# Patient Record
Sex: MALE | Race: WHITE | Employment: OTHER | URBAN - METROPOLITAN AREA
[De-identification: names, ages, dates, MRNs, and addresses within clinical notes are randomized per-mention and may not be internally consistent; named-entity substitution may affect disease eponyms.]

---

## 2021-06-07 ENCOUNTER — HOSPITAL ENCOUNTER (OUTPATIENT)
Dept: CT IMAGING | Facility: HOSPITAL | Age: 66
Discharge: HOME | End: 2021-06-07
Attending: FAMILY MEDICINE

## 2021-06-07 DIAGNOSIS — E78.5 HYPERLIPIDEMIA, UNSPECIFIED HYPERLIPIDEMIA TYPE: ICD-10-CM

## 2023-06-20 ENCOUNTER — HOSPITAL ENCOUNTER (OUTPATIENT)
Dept: GENERAL RADIOLOGY | Age: 68
Discharge: HOME | End: 2023-06-20
Attending: FAMILY MEDICINE
Payer: MEDICARE

## 2023-06-20 DIAGNOSIS — G71.00 MUSCULAR DYSTROPHY (HCC): ICD-10-CM

## 2023-06-20 DIAGNOSIS — M25.512 LEFT SHOULDER PAIN, UNSPECIFIED CHRONICITY: ICD-10-CM

## 2023-06-20 PROCEDURE — 73030 X-RAY EXAM OF SHOULDER: CPT | Performed by: FAMILY MEDICINE

## 2024-10-15 ENCOUNTER — OFFICE VISIT (OUTPATIENT)
Dept: GASTROENTEROLOGY | Facility: CLINIC | Age: 69
End: 2024-10-15

## 2024-10-15 ENCOUNTER — TELEPHONE (OUTPATIENT)
Dept: GASTROENTEROLOGY | Facility: CLINIC | Age: 69
End: 2024-10-15

## 2024-10-15 VITALS
WEIGHT: 167 LBS | SYSTOLIC BLOOD PRESSURE: 134 MMHG | DIASTOLIC BLOOD PRESSURE: 78 MMHG | BODY MASS INDEX: 22.62 KG/M2 | HEIGHT: 72 IN | HEART RATE: 62 BPM

## 2024-10-15 DIAGNOSIS — R19.5 POSITIVE COLORECTAL CANCER SCREENING USING COLOGUARD TEST: Primary | ICD-10-CM

## 2024-10-15 DIAGNOSIS — I49.1 PAC (PREMATURE ATRIAL CONTRACTION): ICD-10-CM

## 2024-10-15 DIAGNOSIS — Z12.11 COLON CANCER SCREENING: ICD-10-CM

## 2024-10-15 PROCEDURE — 99204 OFFICE O/P NEW MOD 45 MIN: CPT | Performed by: INTERNAL MEDICINE

## 2024-10-15 RX ORDER — CHOLECALCIFEROL (VITAMIN D3) 125 MCG
TABLET ORAL AS DIRECTED
COMMUNITY

## 2024-10-15 RX ORDER — MONTELUKAST SODIUM 10 MG/1
10 TABLET ORAL NIGHTLY
COMMUNITY

## 2024-10-15 RX ORDER — CELECOXIB 200 MG/1
200 CAPSULE ORAL DAILY
COMMUNITY

## 2024-10-15 RX ORDER — RAMIPRIL 10 MG/1
10 CAPSULE ORAL DAILY
COMMUNITY

## 2024-10-15 RX ORDER — ASPIRIN 81 MG/1
81 TABLET, CHEWABLE ORAL DAILY
COMMUNITY
Start: 2013-02-14

## 2024-10-15 RX ORDER — ICOSAPENT ETHYL 1 G/1
2 CAPSULE ORAL 2 TIMES DAILY WITH MEALS
COMMUNITY
Start: 2024-07-26

## 2024-10-15 RX ORDER — SODIUM, POTASSIUM,MAG SULFATES 17.5-3.13G
SOLUTION, RECONSTITUTED, ORAL ORAL
Qty: 1 EACH | Refills: 0 | Status: SHIPPED | OUTPATIENT
Start: 2024-10-15

## 2024-10-15 RX ORDER — MULTIVITAMIN
CAPSULE ORAL
COMMUNITY
Start: 2013-02-14

## 2024-10-15 RX ORDER — DIMENHYDRINATE 50 MG
TABLET ORAL
COMMUNITY
Start: 2013-02-14

## 2024-10-15 RX ORDER — NIRMATRELVIR AND RITONAVIR 300-100 MG
3 KIT ORAL 2 TIMES DAILY
COMMUNITY
Start: 2022-04-25

## 2024-10-15 NOTE — PATIENT INSTRUCTIONS
1. Schedule colonoscopy with MAC with the next available provider after cardiac clearance [Diagnosis: Positive Cologuard test]    2.  bowel prep from pharmacy (split dose Suprep)    3. Continue all medications for procedure    4. Read all bowel prep instructions carefully    5. AVOID seeds, nuts, popcorn, raw fruits and vegetables (cooked is okay) for 2-3 days before procedure    6. If you start any NEW medication after your visit today, please notify us. Certain medications will need to be held before the procedure, or the procedure cannot be performed.

## 2024-10-15 NOTE — H&P
[facility] - Gastroenterology                                                                                                               Reason for consult: Positive Cologuard test     Requesting physician or provider: Bernice Ward DO    Chief Complaint   Patient presents with    Colonoscopy Screening     Positive Cologuard test       HPI:   Maximo Peoples Jr. is a 69 year old year-old male with history of muscular dystrophy, HTN, HLD here for the following:    The patient's Cologuard test came back positive in August or September 2024. Patient currently denies any GI symptoms of nausea, vomiting, dyspepsia, dysphagia, hematemesis, abdominal pain, change in bowel habits, thin stools, hematochezia, or melena.  Additionally there is no weight loss and no reported history of chest pain or shortness of breath.    Cardiology work up is ongoing for his h/o frequent PACs.     Prior endoscopies:  He thinks his last CLN was in ~10-11 years ago and negative for polyps.     Soc:  -denies smoking  -denies Etoh  -no illicit drug use    Wt Readings from Last 6 Encounters:   10/15/24 167 lb (75.8 kg)        History, Medications, Allergies, ROS:      History reviewed. No pertinent past medical history.   Past Surgical History:   Procedure Laterality Date    Colonoscopy  2004      Family Hx:   Family History   Problem Relation Age of Onset    Cancer Brother         lung cancer      Social History:   Social History     Socioeconomic History    Marital status:    Tobacco Use    Smoking status: Never    Smokeless tobacco: Never   Substance and Sexual Activity    Alcohol use: Yes     Social Drivers of Health      Received from [facility]    Housing Stability        Medications (Active prior to today's visit):  Current Outpatient Medications   Medication Sig Dispense Refill    aspirin 81 MG Oral Chew Tab Chew 1 tablet  (81 mg total) by mouth daily.      budesonide (RHINOCORT AQUA) 32 MCG/ACT Nasal Suspension by Nasal route.      celecoxib 200 MG Oral Cap Take 1 capsule (200 mg total) by mouth daily.      Coenzyme Q10 (CO Q-10) 100 MG Oral Cap Take by mouth.      Icosapent Ethyl 1 g Oral Cap Take 2 g by mouth 2 (two) times daily with meals.      montelukast 10 MG Oral Tab Take 1 tablet (10 mg total) by mouth nightly.      Multiple Vitamin (MULTIVITAMINS) Oral Cap Take by mouth.      nirmatrelvir-ritonavir (PAXLOVID, 300/100,) 300-100 MG Oral Tablet Therapy Pack Take 3 tablets by mouth 2 (two) times daily.      ramipril 10 MG Oral Cap Take 1 capsule (10 mg total) by mouth daily.      Ergocalciferol (VITAMIN D2) 50 MCG (2000 UT) Oral Tab Take by mouth As Directed.      Na Sulfate-K Sulfate-Mg Sulf (SUPREP BOWEL PREP KIT) 17.5-3.13-1.6 GM/177ML Oral Solution Take as directed 1 each 0       Allergies:  Allergies[1]    ROS:   CONSTITUTIONAL:  negative for fevers, rigors  EYES:  negative for diplopia   RESPIRATORY:  negative for severe shortness of breath  CARDIOVASCULAR:  negative for crushing sub-sternal chest pain  GASTROINTESTINAL:  see HPI  GENITOURINARY:  negative for dysuria or gross hematuria  INTEGUMENT/BREAST:  SKIN:  negative for jaundice   ALLERGIC/IMMUNOLOGIC:  negative for hay fever  ENDOCRINE:  negative for cold intolerance and heat intolerance  MUSCULOSKELETAL:  negative for joint effusion/severe erythema  BEHAVIOR/PSYCH:  negative for psychotic behavior      PHYSICAL EXAM:   Blood pressure (!) 161/95, pulse 62, height 6' (1.829 m), weight 167 lb (75.8 kg).    GEN - Patient appears comfortable and in no acute discomfort  ENT - MMM  EYES - the sclera appears anicteric  CV - no edema  RESP -  No increased work of breathing  ABDOMEN - soft, non-tender exam in all quadrants without rigidity or guarding, non-distended, no abnormal bowel sounds noted, no masses are palpated  SKIN - No jaundice  NEURO - Alert and appropriate,  and gross movements of extremities normal  PSYCH - normal affect, non-agitated      Labs/Imaging:     Patient's pertinent labs and imaging were reviewed and discussed with patient today.      .  ASSESSMENT/PLAN:     Maximo Peoples Jr. is a 69 year old year-old male with history of muscular dystrophy, HTN, HLD here for the following:    Positive Cologuard test, CRC screening - The patient's Cologuard test came back positive in August or September 2024. Last CLN was ~10-11 years ago.  The patient is asymptomatic. Recommend a colonoscopy (CLN) with the next available provider after cardiac clearance (see below).      HTN - BP was elevated on the first check. It was improved on the recheck.  Pt states he monitors it at home and it is wnl.  If BP starts to run high at home, recommend follow up with his PCP/cardiology. He understands that his BP needs to be well controlled before the procedure.     Frequent PAC - pt was referred for a Holter monitor and TTE by cardiology, which is scheduled for 10/17/24.  Pt has follow up with cardiology in November. Recommend getting cardiac clearance after the TTE and not waiting until November so that we can try to expedite his colonoscopy. The patient expressed understanding.      Recommend    - CLN with MAC with the next available provider AFTER cardiac clearance     - CLD the day prior, NPO after midnight, split dose Suprep    - Follow up with PCP/cardiology if BP runs high at home    Colonoscopy consent: I have discussed the risks, benefits, and alternatives to colonoscopy with the patient/primary decision maker [who demonstrated understanding], including but not limited to the risks of bleeding, infection, pain, death, as well as the risks of anesthesia and perforation all leading to prolonged hospitalization, surgical intervention, or even death. I also specifically mentioned the miss rate of colonoscopy of 5-10% in the best of all circumstances. The patient has agreed to sign an  informed consent and elected to proceed with colonoscopy with possible intervention [i.e. polypectomy, stent placement, etc.] as indicated.      Orders This Visit:  No orders of the defined types were placed in this encounter.      Meds This Visit:  Requested Prescriptions     Signed Prescriptions Disp Refills    Na Sulfate-K Sulfate-Mg Sulf (SUPREP BOWEL PREP KIT) 17.5-3.13-1.6 GM/177ML Oral Solution 1 each 0     Sig: Take as directed       Imaging & Referrals:  None         Sara Pineda MD          This note was partially prepared using Dragon Medical voice recognition dictation software. As a result, errors may occur. When identified, these errors have been corrected. While every attempt is made to correct errors during dictation, discrepancies may still exist.          [1] No Known Allergies

## 2024-10-15 NOTE — TELEPHONE ENCOUNTER
Schedulers, see providers orders below:     -Patient was seen in office today and was provided with written and verbal procedure prep instructions, including any medication adjustments.   -Patient was advised to call medical insurance for any questions on benefits and/or any out of pocket costs.   -Patient is aware that the GI schedulers will be calling to schedule procedure(s) and that providers may not have any availability until possibly end of the year.             Instructions    1. Schedule colonoscopy with MAC with the next available provider after cardiac clearance [Diagnosis: Positive Cologuard test]     2.  bowel prep from pharmacy (split dose Suprep)     3. Continue all medications for procedure     4. Read all bowel prep instructions carefully     5. AVOID seeds, nuts, popcorn, raw fruits and vegetables (cooked is okay) for 2-3 days before procedure     6. If you start any NEW medication after your visit today, please notify us. Certain medications will need to be held before the procedure, or the procedure cannot be performed.

## 2024-10-16 ENCOUNTER — TELEPHONE (OUTPATIENT)
Facility: CLINIC | Age: 69
End: 2024-10-16

## 2024-10-16 NOTE — TELEPHONE ENCOUNTER
Spoke to Keyonna at [facility]    She wanted to confirm our fax number below    She is faxing over labs and a cologuard test

## 2024-10-16 NOTE — TELEPHONE ENCOUNTER
[facility] states that they have tried to fax patient records as requested numerous of times but the fax is not going through.   I provided fax # [phone].  She is requesting to speak with the RN requesting records.  Please call

## 2024-10-16 NOTE — TELEPHONE ENCOUNTER
Scheduled for:  Colonoscopy 75382    Provider Name:   Rose    Date:  1/6/2025    Location:  [facility]     Sedation:  MAC    Time:  1130 am (Patient made aware EM will call the day before with an arrival time)    Prep:  Suprep    Meds/Allergies Reconciled?:  Physician reviewed     Diagnosis with codes:    Positive colorectal cancer screening using Cologuard test [R19.5]     Was patient informed to call insurance with codes (Y/N):  Yes, I confirmed [employer] insurance with the patient.     Referral sent?:  N/A    EM or [facility] notified?:  I sent an electronic request to Endo Scheduling and received a confirmation today.      Medication Orders:  This patient verbally confirmed that he is not taking:    Iron, blood thinners, BP meds, and is not diabetic    Not latex allergy, Not PCN allergy and does not have a pacemaker     Misc Orders:      Cartdiac Clearance needed    Cardiologist: Sandra Kahn ([facility])      Further instructions given by staff:   I discussed the prep instructions with the patient which he verbally understood and is aware that I will send the instructions today via [employer].    Advised patient:    You will not be able to drive, operate machinery or make critical decisions the day of your procedure. Please make arrangements for transportation. You must have a  (age 18 or older) to accompany you, stay in the facility for the duration of your procedure and drive you home after the procedure.  You cannot use public transportation (Uber, Lyft, Taxi). The procedure involves sedation, and you will not be allowed to leave unaccompanied. Your procedure will not proceed forward if you're unable to confirm your  planned to escort you home.

## 2024-10-18 NOTE — TELEPHONE ENCOUNTER
Patient follows up with [facility] cardiology  His next follow up appt is 11/11/2024    Will follow up after appt above

## 2024-10-22 ENCOUNTER — TELEPHONE (OUTPATIENT)
Facility: CLINIC | Age: 69
End: 2024-10-22

## 2024-10-22 NOTE — TELEPHONE ENCOUNTER
Patient has questions for the nurse to find out if he purchases the colonoscopy preparation and medications now, if it will still be good to take for procedure on 1/6/2024. Patient requesting for communication to be through the MyChart only.

## 2024-12-30 RX ORDER — TADALAFIL 5 MG/1
5 TABLET ORAL DAILY
COMMUNITY

## 2025-01-06 ENCOUNTER — HOSPITAL ENCOUNTER (OUTPATIENT)
Facility: HOSPITAL | Age: 70
Setting detail: HOSPITAL OUTPATIENT SURGERY
Discharge: HOME | End: 2025-01-06
Attending: INTERNAL MEDICINE | Admitting: INTERNAL MEDICINE
Payer: MEDICARE

## 2025-01-06 ENCOUNTER — ANESTHESIA (OUTPATIENT)
Dept: ENDOSCOPY | Facility: HOSPITAL | Age: 70
End: 2025-01-06
Payer: MEDICARE

## 2025-01-06 ENCOUNTER — ANESTHESIA EVENT (OUTPATIENT)
Dept: ENDOSCOPY | Facility: HOSPITAL | Age: 70
End: 2025-01-06
Payer: MEDICARE

## 2025-01-06 VITALS
BODY MASS INDEX: 22.35 KG/M2 | HEART RATE: 46 BPM | OXYGEN SATURATION: 99 % | WEIGHT: 165 LBS | SYSTOLIC BLOOD PRESSURE: 109 MMHG | HEIGHT: 72 IN | TEMPERATURE: 98 F | DIASTOLIC BLOOD PRESSURE: 64 MMHG | RESPIRATION RATE: 16 BRPM

## 2025-01-06 DIAGNOSIS — R19.5 POSITIVE COLORECTAL CANCER SCREENING USING COLOGUARD TEST: ICD-10-CM

## 2025-01-06 PROCEDURE — 45380 COLONOSCOPY AND BIOPSY: CPT | Performed by: INTERNAL MEDICINE

## 2025-01-06 PROCEDURE — 0DBP8ZX EXCISION OF RECTUM, VIA NATURAL OR ARTIFICIAL OPENING ENDOSCOPIC, DIAGNOSTIC: ICD-10-PCS | Performed by: INTERNAL MEDICINE

## 2025-01-06 PROCEDURE — 45385 COLONOSCOPY W/LESION REMOVAL: CPT | Performed by: INTERNAL MEDICINE

## 2025-01-06 PROCEDURE — 0DBK8ZX EXCISION OF ASCENDING COLON, VIA NATURAL OR ARTIFICIAL OPENING ENDOSCOPIC, DIAGNOSTIC: ICD-10-PCS | Performed by: INTERNAL MEDICINE

## 2025-01-06 RX ORDER — SODIUM CHLORIDE, SODIUM LACTATE, POTASSIUM CHLORIDE, CALCIUM CHLORIDE 600; 310; 30; 20 MG/100ML; MG/100ML; MG/100ML; MG/100ML
INJECTION, SOLUTION INTRAVENOUS CONTINUOUS
OUTPATIENT
Start: 2025-01-06

## 2025-01-06 RX ORDER — SODIUM CHLORIDE, SODIUM LACTATE, POTASSIUM CHLORIDE, CALCIUM CHLORIDE 600; 310; 30; 20 MG/100ML; MG/100ML; MG/100ML; MG/100ML
INJECTION, SOLUTION INTRAVENOUS CONTINUOUS
Status: DISCONTINUED | OUTPATIENT
Start: 2025-01-06 | End: 2025-01-06

## 2025-01-06 RX ORDER — NALOXONE HYDROCHLORIDE 0.4 MG/ML
0.08 INJECTION, SOLUTION INTRAMUSCULAR; INTRAVENOUS; SUBCUTANEOUS ONCE AS NEEDED
OUTPATIENT
Start: 2025-01-06 | End: 2025-01-06

## 2025-01-06 RX ORDER — LIDOCAINE HYDROCHLORIDE 20 MG/ML
INJECTION, SOLUTION EPIDURAL; INFILTRATION; INTRACAUDAL; PERINEURAL AS NEEDED
Status: DISCONTINUED | OUTPATIENT
Start: 2025-01-06 | End: 2025-01-06 | Stop reason: SURG

## 2025-01-06 RX ADMIN — SODIUM CHLORIDE, SODIUM LACTATE, POTASSIUM CHLORIDE, CALCIUM CHLORIDE: 600; 310; 30; 20 INJECTION, SOLUTION INTRAVENOUS at 10:03:00

## 2025-01-06 RX ADMIN — LIDOCAINE HYDROCHLORIDE 40 MG: 20 INJECTION, SOLUTION EPIDURAL; INFILTRATION; INTRACAUDAL; PERINEURAL at 10:05:00

## 2025-01-06 NOTE — ANESTHESIA POSTPROCEDURE EVALUATION
Patient: Maximo Peoples Jr.    Procedure Summary       Date: 01/06/25 Room / Location: [facility] ENDOSCOPY 04 / [facility] ENDOSCOPY    Anesthesia Start: 1003 Anesthesia Stop:     Procedure: COLONOSCOPY Diagnosis:       Positive colorectal cancer screening using Cologuard test      (colon polyps; cecal AVM; diverticulosis)    Surgeons: Olivier Rose MD Anesthesiologist:     Anesthesia Type: general, MAC ASA Status: 2            Anesthesia Type: general, MAC    Vitals Value Taken Time   /64 01/06/25 1025   Temp  01/06/25 1026   Pulse 46 01/06/25 1025   Resp 16 01/06/25 1025   SpO2 99 % 01/06/25 1025       [facility] AN Post Evaluation:   Patient Evaluated in Patient location: Endo recovery.  Patient Participation: complete - patient participated  Level of Consciousness: awake and alert  Pain Score: 0  Pain Management: adequate  Airway Patency:patent  Yes    Nausea/Vomiting: none  Cardiovascular Status: acceptable  Respiratory Status: acceptable  Postoperative Hydration acceptable      Yovana Morales CRNA  1/6/2025 10:26 AM

## 2025-01-06 NOTE — ANESTHESIA PREPROCEDURE EVALUATION
Anesthesia PreOp Note    HPI:     Maximo Peoples Jr. is a 69 year old male who presents for preoperative consultation requested by: Olivier Rose MD    Date of Surgery: 1/6/2025    Procedure(s):  COLONOSCOPY  Indication: Positive colorectal cancer screening using Cologuard test    Relevant Problems   No relevant active problems       NPO:  Last Liquid Consumption Date: 01/06/25  Last Liquid Consumption Time: 0530  Last Solid Consumption Date: 01/05/25  Last Solid Consumption Time: 0800  Last Liquid Consumption Date: 01/06/25          History Review:  There are no active problems to display for this patient.      Past Medical History:   • FSHD (facioscapulohumeral muscular dystrophy) (HCC)   • High cholesterol   • Hx of motion sickness   • Muscular degeneration   • Visual impairment    glasses       Past Surgical History:   Procedure Laterality Date   • Colonoscopy  2013    Dr Janell Suero   • Colonoscopy     • Tonsillectomy         Prescriptions Prior to Admission[1]  Current Medications and Prescriptions Ordered in Epic[2]    Allergies[3]    Family History   Problem Relation Age of Onset   • Cancer Brother         lung cancer     Social History     Socioeconomic History   • Marital status:    Tobacco Use   • Smoking status: Never   • Smokeless tobacco: Never   Vaping Use   • Vaping status: Never Used   Substance and Sexual Activity   • Alcohol use: Yes   • Drug use: Never       Available pre-op labs reviewed.             Vital Signs:  Body mass index is 22.65 kg/m².   height is 1.829 m (6') and weight is 75.8 kg (167 lb).   Vitals:    12/30/24 1615   Weight: 75.8 kg (167 lb)   Height: 1.829 m (6')        Anesthesia Evaluation     Patient summary reviewed and Nursing notes reviewed    Airway   Mallampati: II  TM distance: >3 FB  Neck ROM: full  Dental - Dentition appears grossly intact     Pulmonary - normal exam   Cardiovascular - normal exam  Exercise tolerance: good    ROS comment: NORMAL ECHO 10/2024     Neuro/Psych    (+)  neuromuscular disease,        GI/Hepatic/Renal    (+) bowel prep    Endo/Other    Abdominal                Anesthesia Plan:   ASA:  2  Plan:   General and MAC  Plan Comments: Discussed anesthetic risks such as but, not limited to, CVA, MI, dental damage, awareness and aspiration; verbalizes understanding and wishes to proceed.  The anesthetic dental exam does not represent a complete dental/oral exam. Notations on the dental diagram can help to highlight areas of concern and may no reflect all findings.  Informed Consent Plan and Risks Discussed With:  Patient  Discussed plan with:  Attending    I have informed Maximo Peoples Jr. and/or legal guardian or family member of the nature of the anesthetic plan, benefits, risks including possible dental damage if relevant, major complications, and any alternative forms of anesthetic management.   All of the patient's questions were answered to the best of my ability. The patient desires the anesthetic management as planned.  Yovana Morales CRNA  1/6/2025 9:33 AM  Present on Admission:  **None**           [1]   Medications Prior to Admission   Medication Sig Dispense Refill Last Dose/Taking   • tadalafil 5 MG Oral Tab Take 1 tablet (5 mg total) by mouth daily. Uriinary incontinence   Taking   • aspirin 81 MG Oral Chew Tab Chew 1 tablet (81 mg total) by mouth daily.   12/31/2024   • celecoxib 200 MG Oral Cap Take 1 capsule (200 mg total) by mouth daily.   Taking   • Coenzyme Q10 (CO Q-10) 100 MG Oral Cap Take by mouth.   Taking   • Icosapent Ethyl 1 g Oral Cap Take 2 g by mouth 2 (two) times daily with meals.   Taking   • montelukast 10 MG Oral Tab Take 1 tablet (10 mg total) by mouth nightly.   Taking   • Multiple Vitamin (MULTIVITAMINS) Oral Cap Take by mouth.   Taking   • ramipril 10 MG Oral Cap Take 1 capsule (10 mg total) by mouth daily.   Taking   • Ergocalciferol (VITAMIN D2) 50 MCG (2000 UT) Oral Tab Take by mouth As Directed.   Taking   • Na  Sulfate-K Sulfate-Mg Sulf (SUPREP BOWEL PREP KIT) 17.5-3.13-1.6 GM/177ML Oral Solution Take as directed 1 each 0    [2]   Current Facility-Administered Medications Ordered in Epic   Medication Dose Route Frequency Provider Last Rate Last Admin   • lactated ringers infusion   Intravenous Continuous Olivier Rose MD         No current [facility]-ordered outpatient medications on file.   [3] No Known Allergies

## 2025-01-06 NOTE — OPERATIVE REPORT
COLONOSCOPY REPORT    Maximo Peoples JrPhoenix     1955 Age 69 year old   PCP Bernice Ward DO Endoscopist Olivier Rose MD     Date of procedure: 25    Procedure: Colonoscopy w/cold biopsy and cold snare polypectomy    Pre-operative diagnosis: + cologuard    Post-operative diagnosis: cecal AVM, colon polyps, diverticulosis  and hemorrhoids    Medications: MAC sedation    Withdrawal time: 10 minutes    Procedure:  Informed consent was obtained from the patient after the risks of the procedure were discussed, including but not limited to bleeding, perforation, aspiration, infection, or possibility of a missed lesion. After discussions of the risks/benefits and alternatives to this procedure, as well as the planned sedation, the patient was placed in the left lateral decubitus position and begun on continuous blood pressure pulse oximetry and EKG monitoring and this was maintained throughout the procedure. Once an adequate level of sedation was obtained a digital rectal exam was completed. Then the lubricated tip of the [id number] diagnostic video colonoscope was inserted and advanced without difficulty to the cecum using the CO2 insufflation technique. The cecum was identified by localizing the trifold, the appendix and the ileocecal valve. Withdrawal was begun with thorough washing and careful examination of the colonic walls and folds. A routine second examination of the cecum/ascending colon was performed. Photodocumentation was obtained. The bowel prep was good. Views of the colon were good with washing. I then carefully withdrew the instrument from the patient who tolerated the procedure well.     Complications: none.    Findings:   1. 3 polyp(s) noted as follows:      A. 4 mm polyp in the ascending colon; flat morphology; cold biopsy polypectomy and retrieved.      B. 5-6 mm polyps x2 in the rectum colon; flat morphology; cold snare polypectomy and retrieved.    2. Diverticulosis: left sided.    3.  Terminal ileum: the visualized mucosa appeared normal.    4. A retroflexed view of the rectum revealed hemorrhoids.    5. The colonic mucosa throughout the colon showed normal vascular pattern, without evidence of angioectasias or inflammation.     6. SHAYAN: normal rectal tone, no masses palpated.     Recommend:  Await pathology, likely repeat colonoscopy in 5-7 years. The interval for the next colonoscopy will be determined after reviewing pathology. If new signs or symptoms develop, colonoscopy may need to be repeated sooner.   High fiber diet.  Monitor for blood in the stool. If having more than just tinge of blood, call office or go to the ER.    >>>If tissue was obtained and you have not received your pathology results either by phone or letter within 2 weeks, please call our office at [phone].    Specimens: ascending and rectal polyps

## 2025-01-06 NOTE — DISCHARGE INSTRUCTIONS
Home Care Instructions for Colonoscopy and/or Gastroscopy with Sedation    Diet:  - Resume your regular diet as tolerated unless otherwise instructed.  - Start with light meals to minimize bloating.  - Do not drink alcohol today.    Medication:  - If you have questions about resuming your normal medications, please contact your Primary Care Physician.    Activities:  - Take it easy today. Do not return to work today.  - Do not drive today.  - Do not operate any machinery today (including kitchen equipment).    Colonoscopy:  - You may notice some rectal \"spotting\" (a little blood on the toilet tissue) for a day or two after the exam. This is normal.  - If you experience any rectal bleeding (not spotting), persistent tenderness or sharp severe abdominal pains, oral temperature over 100 degrees Fahrenheit, light-headedness or dizziness, or any other problems, contact your doctor.    Gastroscopy:  - You may have a sore throat for 2-3 days following the exam. This is normal. Gargling with warm salt water (1/2 tsp salt to 1 glass warm water) or using throat lozenges will help.  - If you experience any sharp pain in your neck, abdomen or chest, vomiting of blood, oral temperature over 100 degrees Fahrenheit, light-headedness or dizziness, or any other problems, contact your doctor.    **If unable to reach your doctor, please go to the [facility] Emergency Room**    - Your referring physician will receive a full report of your examination.  - If you do not hear from your doctor's office within two weeks of your biopsy, please call them for your results.    You may be able to see your laboratory results in [employer] between 4 and 7 business days.  In some cases, your physician may not have viewed the results before they are released to [employer].  If you have questions regarding your results contact the physician who ordered the test/exam by phone or via [employer] by choosing \"Ask a Medical Question.\"

## 2025-01-07 ENCOUNTER — TELEPHONE (OUTPATIENT)
Facility: CLINIC | Age: 70
End: 2025-01-07

## 2025-01-07 NOTE — TELEPHONE ENCOUNTER
----- Message from Olivier Rose sent at 1/6/2025  4:29 PM CST -----  GI staff: please place recall for colonoscopy in 5 years

## 2025-01-07 NOTE — TELEPHONE ENCOUNTER
Recall colonoscopy in 5 years per Dr. Rose. Colonoscopy done on 1/6/25.    Health maintenance updated and message sent to pt outreach to repeat colon in 5 years.    [employer] message sent by MD and read by pt.

## 2025-01-08 NOTE — H&P
Pre Procedure History & Physical Examination    Patient Name: Maximo Peoples Jr.  MRN: [id number]  CSN: [id number]  YOB: 1955    Diagnosis: + cologuard      Prescriptions Prior to Admission[1]  No current facility-administered medications for this encounter.       Allergies: Allergies[2]    Past Medical History:    FSHD (facioscapulohumeral muscular dystrophy) (HCC)    High cholesterol    Hx of motion sickness    Muscular degeneration    Visual impairment    glasses     Past Surgical History:   Procedure Laterality Date    Colonoscopy  2013    Dr Janell Suero    Colonoscopy      Colonoscopy N/A 1/6/2025    Procedure: COLONOSCOPY;  Surgeon: Olivier Rose MD;  Location: [facility] ENDOSCOPY    Tonsillectomy       Family History   Problem Relation Age of Onset    Cancer Brother         lung cancer     Social History     Tobacco Use    Smoking status: Never    Smokeless tobacco: Never   Substance Use Topics    Alcohol use: Yes         ROS:   CONSTITUTIONAL:  negative for fevers, rigors  EYES:  negative for diplopia   RESPIRATORY:  negative for severe shortness of breath  CARDIOVASCULAR:  negative for crushing sub-sternal chest pain  GASTROINTESTINAL:  see HPI  GENITOURINARY:  negative for dysuria or gross hematuria  INTEGUMENT/BREAST:  SKIN:  negative for jaundice   ALLERGIC/IMMUNOLOGIC:  negative for hay fever  ENDOCRINE:  negative for cold intolerance and heat intolerance  MUSCULOSKELETAL:  negative for joint effusion/severe erythema  BEHAVIOR/PSYCH:  negative for psychotic behavior      PHYSICAL EXAM:   /64   Pulse (!) 46   Temp 98 °F (36.7 °C) (Temporal)   Resp 16   Ht 6' (1.829 m)   Wt 165 lb (74.8 kg)   SpO2 99%   BMI 22.38 kg/m²       Gen: Patient appears comfortable and in no acute discomfort  HEENT: the sclera appears anicteric, oropharynx clear, mucus membranes appear moist  CV: regular rate and rhythm, the extremities are warm and well perfused   Lung: Moves air well; No labored  breathing  Abdomen: soft, non-tender exam in all quadrants without rigidity or guarding, non-distended, no abnormal bowel sounds noted, no masses are palpated  Skin: No jaundice  Ext: no cyanosis, clubbing or edema is evident.   Neuro: Alert and interactive, and gross movements of extremities normal    I have discussed the risks and benefits and alternatives of the procedure with the patient/family.  They understand and agree to proceed with plan of care.   I have reviewed recent H&P/clinic notes  Olivier Rose MD  [facility] - Gastroenterology         [1]   No medications prior to admission.   [2] No Known Allergies

## (undated) DEVICE — 60 ML SYRINGE REGULAR TIP: Brand: MONOJECT

## (undated) DEVICE — KIT ENDO ORCAPOD 160/180/190

## (undated) DEVICE — CO2 CANNULA,SSOFT,ADLT,7O2,4CO2,FEMALE: Brand: MEDLINE

## (undated) DEVICE — KIT CLEAN ENDOKIT 1.1OZ GOWNX2

## (undated) DEVICE — GIJAW SINGLE-USE BIOPSY FORCEPS WITH NEEDLE: Brand: GIJAW

## (undated) DEVICE — LASSO POLYPECTOMY SNARE: Brand: LASSO

## (undated) DEVICE — V2 SPECIMEN COLLECTION MANIFOLD KIT: Brand: NEPTUNE

## (undated) DEVICE — MEDI-VAC NON-CONDUCTIVE SUCTION TUBING 6MM X 1.8M (6FT.) L: Brand: CARDINAL HEALTH

## (undated) NOTE — LETTER
[facility]  [address], [place], [place]    Authorization for Surgical Operation and Procedure                               I hereby authorize Olivier Rose MD, my physician and his/her assistants (if applicable), which may include medical students, residents, and/or fellows, to perform the following surgical operation/ procedure and administer such anesthesia as may be determined necessary by my physician: Operation/Procedure name (s) COLONOSCOPY on Maximo Peoples Jr.   2.   I recognize that during the surgical operation/procedure, unforeseen conditions may necessitate additional or different procedures than those listed above.  I, therefore, further authorize and request that the above-named surgeon, assistants, or designees perform such procedures as are, in their judgment, necessary and desirable.    3.   My surgeon/physician has discussed prior to my surgery the potential benefits, risks and side effects of this procedure; the likelihood of achieving goals; and potential problems that might occur during recuperation.  They also discussed reasonable alternatives to the procedure, including risks, benefits, and side effects related to the alternatives and risks related to not receiving this procedure.  I have had all my questions answered and I acknowledge that no guarantee has been made as to the result that may be obtained.    4.   Should the need arise during my operation/procedure, which includes change of level of care prior to discharge, I also consent to the administration of blood and/or blood products.  Further, I understand that despite careful testing and screening of blood or blood products by collecting agencies, I may still be subject to ill effects as a result of receiving a blood transfusion and/or blood products.  The following are some, but not all, of the potential risks that can occur: fever and allergic reactions, hemolytic reactions, transmission of diseases such as  Hepatitis, AIDS and Cytomegalovirus (CMV) and fluid overload.  In the event that I wish to have an autologous transfusion of my own blood, or a directed donor transfusion, I will discuss this with my physician.  Check only if Refusing Blood or Blood Products  I understand refusal of blood or blood products as deemed necessary by my physician may have serious consequences to my condition to include possible death. I hereby assume responsibility for my refusal and release the hospital, its personnel, and my physicians from any responsibility for the consequences of my refusal.    o  Refuse   5.   I authorize the use of any specimen, organs, tissues, body parts or foreign objects that may be removed from my body during the operation/procedure for diagnosis, research or teaching purposes and their subsequent disposal by hospital authorities.  I also authorize the release of specimen test results and/or written reports to my treating physician on the hospital medical staff or other referring or consulting physicians involved in my care, at the discretion of the Pathologist or my treating physician.    6.   I consent to the photographing or videotaping of the operations or procedures to be performed, including appropriate portions of my body for medical, scientific, or educational purposes, provided my identity is not revealed by the pictures or by descriptive texts accompanying them.  If the procedure has been photographed/videotaped, the surgeon will obtain the original picture, image, videotape or CD.  The hospital will not be responsible for storage, release or maintenance of the picture, image, tape or CD.    7.   I consent to the presence of a  or observers in the operating room as deemed necessary by my physician or their designees.    8.   I recognize that in the event my procedure results in extended X-Ray/fluoroscopy time, I may develop a skin reaction.    9. If I have a Do Not Attempt  Resuscitation (DNAR) order in place, that status will be suspended while in the operating room, procedural suite, and during the recovery period unless otherwise explicitly stated by me (or a person authorized to consent on my behalf). The surgeon or my attending physician will determine when the applicable recovery period ends for purposes of reinstating the DNAR order.  10. Patients having a sterilization procedure: I understand that if the procedure is successful the results will be permanent and it will therefore be impossible for me to inseminate, conceive, or bear children.  I also understand that the procedure is intended to result in sterility, although the result has not been guaranteed.   11. I acknowledge that my physician has explained sedation/analgesia administration to me including the risk and benefits I consent to the administration of sedation/analgesia as may be necessary or desirable in the judgment of my physician.    I CERTIFY THAT I HAVE READ AND FULLY UNDERSTAND THE ABOVE CONSENT TO OPERATION and/or OTHER PROCEDURE.     ____________________________________  _________________________________        ______________________________  Signature of Patient    Signature of Responsible Person                Printed Name of Responsible Person                                      ____________________________________  _____________________________                ________________________________  Signature of Witness        Date  Time         Relationship to Patient    STATEMENT OF PHYSICIAN My signature below affirms that prior to the time of the procedure; I have explained to the patient and/or his/her legal representative, the risks and benefits involved in the proposed treatment and any reasonable alternative to the proposed treatment. I have also explained the risks and benefits involved in refusal of the proposed treatment and alternatives to the proposed treatment and have answered the patient's  questions. If I have a significant financial interest in a co-management agreement or a significant financial interest in any product or implant, or other significant relationship used in this procedure/surgery, I have disclosed this and had a discussion with my patient.     _____________________________________________________              _____________________________  (Signature of Physician)                                                                                         (Date)                                   (Time)  Patient Name: Maximo GRETCHEN Peoples Jr.      : 1955      Printed: 2024     Medical Record #: [id number]                                      Page 1 of 1